# Patient Record
Sex: FEMALE | Race: BLACK OR AFRICAN AMERICAN
[De-identification: names, ages, dates, MRNs, and addresses within clinical notes are randomized per-mention and may not be internally consistent; named-entity substitution may affect disease eponyms.]

---

## 2019-08-05 ENCOUNTER — HOSPITAL ENCOUNTER (OUTPATIENT)
Dept: HOSPITAL 97 - ER | Age: 54
Setting detail: OBSERVATION
LOS: 3 days | Discharge: HOME | End: 2019-08-08
Attending: FAMILY MEDICINE | Admitting: FAMILY MEDICINE
Payer: COMMERCIAL

## 2019-08-05 VITALS — BODY MASS INDEX: 30.3 KG/M2

## 2019-08-05 DIAGNOSIS — E87.6: ICD-10-CM

## 2019-08-05 DIAGNOSIS — E87.5: ICD-10-CM

## 2019-08-05 DIAGNOSIS — E66.9: ICD-10-CM

## 2019-08-05 DIAGNOSIS — K80.00: Primary | ICD-10-CM

## 2019-08-05 LAB
ALBUMIN SERPL BCP-MCNC: 4 G/DL (ref 3.4–5)
ALP SERPL-CCNC: 121 U/L (ref 45–117)
ALT SERPL W P-5'-P-CCNC: 21 U/L (ref 12–78)
AST SERPL W P-5'-P-CCNC: 18 U/L (ref 15–37)
BUN BLD-MCNC: 9 MG/DL (ref 7–18)
GLUCOSE SERPLBLD-MCNC: 98 MG/DL (ref 74–106)
HCT VFR BLD CALC: 40.7 % (ref 36–45)
LIPASE SERPL-CCNC: 133 U/L (ref 73–393)
LYMPHOCYTES # SPEC AUTO: 1.4 K/UL (ref 0.7–4.9)
PMV BLD: 8.8 FL (ref 7.6–11.3)
POTASSIUM SERPL-SCNC: 3.5 MMOL/L (ref 3.5–5.1)
RBC # BLD: 4.39 M/UL (ref 3.86–4.86)
TROPONIN (EMERG DEPT USE ONLY): < 0.02 NG/ML (ref 0–0.04)

## 2019-08-05 PROCEDURE — 84132 ASSAY OF SERUM POTASSIUM: CPT

## 2019-08-05 PROCEDURE — 76705 ECHO EXAM OF ABDOMEN: CPT

## 2019-08-05 PROCEDURE — 84484 ASSAY OF TROPONIN QUANT: CPT

## 2019-08-05 PROCEDURE — 36415 COLL VENOUS BLD VENIPUNCTURE: CPT

## 2019-08-05 PROCEDURE — 94760 N-INVAS EAR/PLS OXIMETRY 1: CPT

## 2019-08-05 PROCEDURE — 83735 ASSAY OF MAGNESIUM: CPT

## 2019-08-05 PROCEDURE — 80076 HEPATIC FUNCTION PANEL: CPT

## 2019-08-05 PROCEDURE — 80053 COMPREHEN METABOLIC PANEL: CPT

## 2019-08-05 PROCEDURE — 96374 THER/PROPH/DIAG INJ IV PUSH: CPT

## 2019-08-05 PROCEDURE — 85025 COMPLETE CBC W/AUTO DIFF WBC: CPT

## 2019-08-05 PROCEDURE — 74181 MRI ABDOMEN W/O CONTRAST: CPT

## 2019-08-05 PROCEDURE — 93005 ELECTROCARDIOGRAM TRACING: CPT

## 2019-08-05 PROCEDURE — 74177 CT ABD & PELVIS W/CONTRAST: CPT

## 2019-08-05 PROCEDURE — 80048 BASIC METABOLIC PNL TOTAL CA: CPT

## 2019-08-05 PROCEDURE — 47562 LAPAROSCOPIC CHOLECYSTECTOMY: CPT

## 2019-08-05 PROCEDURE — 83690 ASSAY OF LIPASE: CPT

## 2019-08-05 PROCEDURE — 99285 EMERGENCY DEPT VISIT HI MDM: CPT

## 2019-08-05 PROCEDURE — 88304 TISSUE EXAM BY PATHOLOGIST: CPT

## 2019-08-06 RX ADMIN — DEXTROSE AND SODIUM CHLORIDE SCH MLS: 5; .45 INJECTION, SOLUTION INTRAVENOUS at 11:05

## 2019-08-06 RX ADMIN — METRONIDAZOLE SCH MLS: 500 INJECTION, SOLUTION INTRAVENOUS at 17:56

## 2019-08-06 RX ADMIN — MORPHINE SULFATE PRN MG: 2 INJECTION, SOLUTION INTRAMUSCULAR; INTRAVENOUS at 21:19

## 2019-08-06 RX ADMIN — MORPHINE SULFATE PRN MG: 2 INJECTION, SOLUTION INTRAMUSCULAR; INTRAVENOUS at 11:10

## 2019-08-06 RX ADMIN — DEXTROSE AND SODIUM CHLORIDE SCH MLS: 5; .45 INJECTION, SOLUTION INTRAVENOUS at 20:24

## 2019-08-06 RX ADMIN — CEFTRIAXONE SCH MLS: 1 INJECTION, SOLUTION INTRAVENOUS at 15:14

## 2019-08-06 RX ADMIN — Medication SCH ML: at 20:21

## 2019-08-06 RX ADMIN — MORPHINE SULFATE PRN MG: 2 INJECTION, SOLUTION INTRAMUSCULAR; INTRAVENOUS at 15:15

## 2019-08-06 NOTE — EKG
Test Date:    2019-08-05               Test Time:    23:48:06

Technician:   VALERIE                                     

                                                     

MEASUREMENT RESULTS:                                       

Intervals:                                           

Rate:         51                                     

ME:           136                                    

QRSD:         82                                     

QT:           414                                    

QTc:          381                                    

Axis:                                                

P:            55                                     

ME:           136                                    

QRS:          14                                     

T:            91                                     

                                                     

INTERPRETIVE STATEMENTS:                                       

                                                     

Sinus bradycardia

Nonspecific T wave abnormality

Abnormal ECG

No previous ECG available for comparison



Electronically Signed On 08-06-19 07:33:40 CDT by Marvin Petty

## 2019-08-06 NOTE — ER
Nurse's Notes                                                                                     

 [facility]                                                                 

Name: Michelle Stevens                                                                                

Age: 53 yrs                                                                                       

Sex: Female                                                                                       

: 1965                                                                                   

MRN: [id number]                                                                                   

Arrival Date: 2019                                                                          

Time: 20:32                                                                                       

Account#: [id number]                                                                            

Bed 13                                                                                            

Private MD:                                                                                       

Diagnosis: Abdominal and pelvic pain;Cholelithiasis;Cholecystitis;Choledochalithiasis             

                                                                                                  

Presentation:                                                                                     

                                                                                             

21:05 Presenting complaint: Patient states: epigastric pain started Saturday. pt stated she   ak1 

      took advil last night, pain resolved. pt c/o increased pain this morning with N/V           

      today. Transition of care: patient was not received from another setting of care. Onset     

      of symptoms is unknown. Risk Assessment: Do you want to hurt yourself or someone else?      

      Patient reports no desire to harm self or others. Initial Sepsis Screen: Does the           

      patient meet any 2 criteria? No. Patient's initial sepsis screen is negative. Does the      

      patient have a suspected source of infection? No. Patient's initial sepsis screen is        

      negative. Care prior to arrival: None.                                                      

21:05 Method Of Arrival: Ambulatory                                                           ak1 

21:05 Acuity: SANTIAGO 3                                                                           ak1 

                                                                                                  

Triage Assessment:                                                                                

21:06 General: Appears in no apparent distress. uncomfortable, Behavior is calm, cooperative. ak1 

                                                                                                  

OB/GYN:                                                                                           

21:06 LMP N/A - Post-menopause                                                                ak1 

                                                                                                  

Historical:                                                                                       

- Allergies:                                                                                      

21:06 No Known Allergies;                                                                     ak1 

- Home Meds:                                                                                      

21:06 None [Active];                                                                          ak1 

- PMHx:                                                                                           

21:06 None;                                                                                   ak1 

- PSHx:                                                                                           

21:06 None;                                                                                   ak1 

                                                                                                  

- Immunization history:: Adult Immunizations unknown.                                             

- Social history:: Smoking status: Patient/guardian denies using tobacco.                         

- Ebola Screening: : No symptoms or risks identified at this time.                                

                                                                                                  

                                                                                                  

Screenin:45 Abuse screen: Denies threats or abuse. Denies injuries from another. Abuse screen:.     aa1 

      Nutritional screening: No deficits noted. Tuberculosis screening: No symptoms or risk       

      factors identified. Fall Risk None identified.                                              

                                                                                                  

Assessment:                                                                                       

21:45 General: Appears in no apparent distress. comfortable, Behavior is calm, cooperative,   aa1 

      appropriate for age. Pain: Complains of pain in epigastric area Pain currently is 10        

      out of 10 on a pain scale. Pain began 2-3 days ago. Neuro: Level of Consciousness is        

      awake, alert, obeys commands, Oriented to person, place, time, situation, Moves all         

      extremities. Full function. Cardiovascular: Denies chest pain, palpitations, shortness      

      of breath, Heart tones S1 S2 present Rhythm is regular. Respiratory: Airway is patent       

      Respiratory effort is even, unlabored, Respiratory pattern is regular, symmetrical. GI:     

      Abdomen is non-distended, Bowel sounds present X 4 quads. Abd is soft and non tender X      

      4 quads. Reports epigastric pain, nausea, vomiting. : No signs and/or symptoms were       

      reported regarding the genitourinary system. EENT: No signs and/or symptoms were            

      reported regarding the EENT system. Derm: Skin is intact, is healthy with good turgor,      

      Skin is pink, warm \T\ dry. Musculoskeletal: Circulation, motion, and sensation intact.     

      Capillary refill < 3 seconds.                                                               

22:30 Reassessment: Patient appears in no apparent distress at this time. Patient and/or      aa1 

      family updated on plan of care and expected duration. Pain level reassessed. Patient is     

      alert, oriented x 3, equal unlabored respirations, skin warm/dry/pink. Awaiting charge      

      nurse for u/s IV.                                                                           

23:50 Reassessment: Patient appears in no apparent distress at this time. Patient and/or      aa1 

      family updated on plan of care and expected duration. Pain level reassessed. Patient is     

      alert, oriented x 3, equal unlabored respirations, skin warm/dry/pink. Awaiting             

      provider reassessment.                                                                      

08                                                                                             

00:30 Reassessment: Patient appears in no apparent distress at this time. Patient and/or      aa1 

      family updated on plan of care and expected duration. Pain level reassessed. Patient is     

      alert, oriented x 3, equal unlabored respirations, skin warm/dry/pink. Awaiting CT scan.    

01:34 Reassessment: Patient appears in no apparent distress at this time. Patient and/or      aa1 

      family updated on plan of care and expected duration. Pain level reassessed. Patient is     

      alert, oriented x 3, equal unlabored respirations, skin warm/dry/pink. Pt taken to CT       

      at this time.                                                                               

02:05 Reassessment: Patient appears in no apparent distress at this time. Patient and/or      aa1 

      family updated on plan of care and expected duration. Pain level reassessed. Patient is     

      alert, oriented x 3, equal unlabored respirations, skin warm/dry/pink. Pt back from CT      

      and CT tech reports IV infiltrated with IV contrast. IV dc'd and warm compress applied.     

03:30 Reassessment: Patient appears in no apparent distress at this time. Patient and/or      aa1 

      family updated on plan of care and expected duration. Pain level reassessed. Patient is     

      alert, oriented x 3, equal unlabored respirations, skin warm/dry/pink. Awaiting CT          

      results.                                                                                    

04:03 Reassessment: Patient appears in no apparent distress at this time. Patient and/or      jb4 

      family updated on plan of care and expected duration. Pain level reassessed. Patient is     

      alert, oriented x 3, equal unlabored respirations, skin warm/dry/pink. Pt given warming     

      pack for area of infiltration.                                                              

04:51 Reassessment: Patient appears in no apparent distress at this time. Patient and/or      jb4 

      family updated on plan of care and expected duration. Pain level reassessed. Patient is     

      alert, oriented x 3, equal unlabored respirations, skin warm/dry/pink. Swelling at the      

      site of infiltration appears to have decreased. Pt reports feeling better and like the      

      swelling has gone down.                                                                     

06:00 Reassessment: Patient appears in no apparent distress at this time. Patient and/or      jb4 

      family updated on plan of care and expected duration. Pain level reassessed. Patient is     

      alert, oriented x 3, equal unlabored respirations, skin warm/dry/pink.                      

06:52 Reassessment: Patient appears in no apparent distress at this time. Patient and/or      jb4 

      family updated on plan of care and expected duration. Pain level reassessed. Patient is     

      alert, oriented x 3, equal unlabored respirations, skin warm/dry/pink.                      

07:00 General: Appears in no apparent distress. comfortable, Behavior is calm, cooperative.   rb1 

      Pain: Pain currently is 2 out of 10 on a pain scale. Neuro: Level of Consciousness is       

      awake, alert, obeys commands, Oriented to person, place, time, situation.                   

      Cardiovascular: Capillary refill < 3 seconds is brisk in bilateral fingers.                 

      Respiratory: Airway is patent Respiratory effort is even, unlabored, Respiratory            

      pattern is regular, symmetrical. Derm: Skin is dry, Skin is normal, Skin temperature is     

      warm. Musculoskeletal: Capillary refill < 3 seconds, is brisk, in bilateral fingers.        

07:09 Reassessment: Pt. went for testing.                                                     rb1 

08:00 Reassessment: Patient appears in no apparent distress at this time. No changes from     rb1 

      previously documented assessment.                                                           

08:30 Reassessment: Dr. Barcenas is at the pt. bedside.                                          rb1 

09:00 Reassessment: Patient appears in no apparent distress at this time. Patient and/or      rb1 

      family updated on plan of care and expected duration. Pain level reassessed. Patient is     

      alert, oriented x 3, equal unlabored respirations, skin warm/dry/pink.                      

10:00 Reassessment: Patient appears in no apparent distress at this time. No changes from     rb1 

      previously documented assessment. Pt. is watching TV.                                       

10:11 Reassessment: Dr. Palm is at the pt. bedside.                                          rb1 

11:00 Reassessment: Patient appears in no apparent distress at this time. Patient and/or      rb1 

      family updated on plan of care and expected duration. Pain level reassessed. Patient is     

      alert, oriented x 3, equal unlabored respirations, skin warm/dry/pink.                      

12:00 Reassessment: Patient appears in no apparent distress at this time. No changes from     rb1 

      previously documented assessment.                                                           

12:05 Reassessment: Gave report to CASS Zarate. Information from the SBAR was given. All        rb1 

      questions asked and answered.                                                               

                                                                                                  

Vital Signs:                                                                                      

                                                                                             

21:06  / 73; Pulse 56; Resp 18; Temp 97.1; Pulse Ox 100% on R/A; Weight 77.11 kg (R);   ak1 

      Height 5 ft. 2 in. (157.48 cm) (R); Pain 10/10;                                             

23:50  / 75; Pulse 58; Resp 16; Temp 97.3; Pulse Ox 99% on R/A; Pain 2/10;              aa1 

                                                                                             

01:00  / 75; Pulse 57; Resp 16; Pulse Ox 100% on R/A;                                   aa1 

02:30  / 77; Pulse 50; Resp 16; Pulse Ox 100% on R/A;                                   aa1 

03:30  / 60; Pulse 51; Resp 16; Pulse Ox 100% on R/A;                                   aa1 

04:30  / 59; Pulse 50; Resp 16; Temp 98.3(O); Pulse Ox 100% on R/A;                     bb  

05:44  / 59; Pulse 67; Resp 16; Pulse Ox 99% on R/A;                                    mt  

06:00  / 57; Pulse 75; Resp 16; Pulse Ox 100% on R/A;                                   jb4 

07:00  / 71; Pulse 63; Resp 17; Temp 98.4(O); Pulse Ox 100% on R/A; Pain 2/10;          rb1 

08:00  / 70; Pulse 63; Resp 16; Temp 98.4(O); Pulse Ox 100% on R/A; Pain 2/10;          rb1 

09:00  / 76; Pulse 49; Resp 15; Temp 98.2(O); Pulse Ox 100% on R/A; Pain 2/10;          rb1 

10:00  / 61; Pulse 62; Resp 16; Temp 98.3(O); Pulse Ox 100% on R/A; Pain 2/10;          rb1 

11:00  / 73; Pulse 76; Resp 17; Temp 98.1(O); Pulse Ox 98% ; Pain 5/10;                 rb1 

11:55  / 70; Pulse 55; Resp 16; Temp 97.9(O); Pulse Ox 100% ; Pain 0/10;                rb1 

08/05                                                                                             

21:06 Body Mass Index 31.09 (77.11 kg, 157.48 cm)                                             ak1 

                                                                                                  

ED Course:                                                                                        

                                                                                             

20:32 Patient arrived in ED.                                                                  cl3 

21:06 Triage completed.                                                                       ak1 

21:06 Arm band placed on Patient placed in waiting room, Patient notified of wait time.       ak1 

21:45 Patient has correct armband on for positive identification. Bed in low position. Call   aa1 

      light in reach. Pulse ox on. NIBP on. Warm blanket given.                                   

21:58 Naomie Guerrier FNP-C is PHCP.                                                        kb  

21:58 Joo Martines MD is Attending Physician.                                             kb  

22:22 Olga Mohamud RN is Primary Nurse.                                                aa1 

23:15 Initial lab(s) drawn, by me, sent to lab. Missed attempt(s): 18 gauge in right          bb  

      antecubital area. Bleeding controlled, band aid applied, catheter tip intact. Inserted      

      saline lock: 22 gauge in left antecubital area, using aseptic technique.                    

23:30 EKG done, by ED staff, reviewed by Naomie DIAZ.                               aa1 

08/06                                                                                             

02:05 IV discontinued, intact, bleeding controlled, Pressure dressing applied.                aa1 

02:24 CT Abd/Pelvis - IV Contrast Only In Process Unspecified.                                EDMS

03:46 Report given to Nicho Reynoso RN.                                                       aa1 

04:03 Ilia Reynoso, RN is Primary Nurse.                                                     jb4 

06:15 Inserted saline lock: 24 gauge in right hand, using aseptic technique.                  ea  

07:16 US Abdomen Limited In Process Unspecified.                                              EDMS

07:50 Patient moved to MRI via wheelchair.                                                    em2 

08:11 Cholangiogram In Process Unspecified.                                                   EDMS

08:19 MRI completed. Patient tolerated well. Patient moved back from [facility].                     em2 

08:49 Peyton Palm MD is Hospitalizing Provider.                                            kdr 

12:20 No provider procedures requiring assistance completed. Patient admitted, IV remains in  rb1 

      place.                                                                                      

                                                                                                  

Administered Medications:                                                                         

06:20 Drug: Zosyn 4.5 grams Route: IV; Rate: calculated rate; Site: right hand;               jb4 

                                                                                                  

                                                                                                  

Outcome:                                                                                          

08:50 Decision to Hospitalize by Provider.                                                    kdr 

12:20 Admitted to Tele accompanied by tech, family with patient, via wheelchair, room 429,    rb1 

      with chart, Report called to  CASS Zarate                                                     

12:20 Condition: stable                                                                           

12:20 Instructed on the need for admit.                                                           

12:21 Patient left the ED.                                                                    rb1 

                                                                                                  

Signatures:                                                                                       

Dispatcher [employer]                           EDMS                                                 

Naomie Guerrier, WENDYP-C                 FNP-CkOlga Wick, RN                  RN   aa1                                                  

Jem Campuzano MD MD kdr Ballard, Brenda RN                     RN   Ryder Napier                              em2                                                  

Lisset Nayak RN                       RN   ak1                                                  

Natalie Chapman, RN                     RN   Ilia Esquivel, RN                       Viridiana Goss mt, Elena, RN RN ea Lewis, Charde                                cl3                                                  

                                                                                                  

Corrections: (The following items were deleted from the chart)                                    

05:41 04:30  / 59; Pulse 50bpm; Resp 16bpm; Pulse Ox 100% RA; amanda duvall  

                                                                                                  

**************************************************************************************************

## 2019-08-06 NOTE — CON
Date of Consultation:  08/06/2019



Reason For Consultation:  Abdominal pain.



History Of Present Illness:  The patient is a 53-year-old black female who comes in with a 2-day hist
ory of epigastric right upper quadrant pain going to the back, associated with nausea and vomiting, o
ccasional bloating, belching, and heartburn.  She has had similar symptoms in the past.  Fried food m
akes it worse.  No sore throat, runny nose, cough, headaches, or dizziness.  No chest pain.  No fever
 or chills.  No diarrhea or constipation.  No blood in her stool.  She has had hematuria in the past,
 which has been worked up and resolved.



Review of Systems:

Otherwise unremarkable.



Past Medical History:  Negative.



Past Surgical History:  Negative.



Allergies:  NONE.



Social History:  Patient does not smoke, drinks occasionally.



Family History:  Significant for heart disease.



Physical Examination:

Vital Signs:  Stable.  She is currently afebrile. 

General:  She is awake, alert, and oriented x3. 

Head and Neck:  No evidence of icterus.  Cranial nerves 2 through 12 grossly within normal limits.  N
o neck masses.  No JVD.  Throat clear.  Neck is supple. 

Chest:  Clear. 

Heart:  S1, S2. 

Abdomen:  Soft.  Mild right upper quadrant tenderness.  No rebound, rigidity, or guarding. 

Extremities:  Adequately perfused and nontender. 

Neuro:  Nonfocal.



Laboratory Data:  White count is 10,000 with a left shift.  Chemistry shows slight elevation of the a
lkaline phosphatase to 121.



Imaging Data:  She had an abdominal ultrasound, CAT scan of the abdomen and pelvis, and an MRCP.  The
 ultrasound shows cholelithiasis with cholecystitis.  Small echogenic structure within the duct may r
epresent a stone or debris.  The duct is normal in caliber.  Patient had an MRCP, which shows choleli
thiasis with cholecystitis.  The gallbladder wall was thickened.  The biliary tree is upper limits of
 normal.



Assessment:  Acute cholecystitis, cholelithiasis, and possible choledocholithiasis.



Recommendations:  Admit, n.p.o., IV fluid, IV antibiotics, GI consultation for possible ERCP.  Discus
sed the case with Dr. Tilley.  He will evaluate and once he is done evaluating, following which, we w
ill proceed with laparoscopic cholecystectomy, possible open.  Patient understands the risks, benefit
s, and alternatives and agrees to procedure.





AS/MODL

DD:  08/06/2019 10:12:34Voice ID:  [id number]

DT:  08/06/2019 14:22:13Report ID:  [id number]

## 2019-08-06 NOTE — EDPHYS
Physician Documentation                                                                           

 [facility]                                                                 

Name: Michelle Stevens                                                                                

Age: 53 yrs                                                                                       

Sex: Female                                                                                       

: 1965                                                                                   

MRN: [id number]                                                                                   

Arrival Date: 2019                                                                          

Time: 20:32                                                                                       

Account#: [id number]                                                                            

Bed 13                                                                                            

Private MD:                                                                                       

ED Physician Joo Martines                                                                      

HPI:                                                                                              

                                                                                             

00:29 This 53 yrs old Black Female presents to ER via Ambulatory with complaints of Abdominal kb  

      Pain.                                                                                       

00:29 The patient presents with abdominal pain in the right upper quadrant. Onset: The        kb  

      symptoms/episode began/occurred 2 day(s) ago. The symptoms do not radiate. Associated       

      signs and symptoms: Pertinent positives: nausea and vomiting. The symptoms are              

      described as intermittent. Modifying factors: The symptoms are alleviated by nothing,       

      the symptoms are aggravated by nothing. Severity of pain: At its worst the pain was         

      moderate in the emergency department the pain is unchanged. The patient has not             

      experienced similar symptoms in the past. The patient has not recently seen a physician.    

                                                                                                  

OB/GYN:                                                                                           

                                                                                             

21:06 LMP N/A - Post-menopause                                                                ak1 

                                                                                                  

Historical:                                                                                       

- Allergies:                                                                                      

21:06 No Known Allergies;                                                                     ak1 

- Home Meds:                                                                                      

21:06 None [Active];                                                                          ak1 

- PMHx:                                                                                           

21:06 None;                                                                                   ak1 

- PSHx:                                                                                           

21:06 None;                                                                                   ak1 

                                                                                                  

- Immunization history:: Adult Immunizations unknown.                                             

- Social history:: Smoking status: Patient/guardian denies using tobacco.                         

- Ebola Screening: : No symptoms or risks identified at this time.                                

                                                                                                  

                                                                                                  

ROS:                                                                                              

                                                                                             

00:29 Constitutional: Negative for fever, chills, and weight loss, ENT: Negative for injury,  kb  

      pain, and discharge, Neck: Negative for injury, pain, and swelling, Cardiovascular:         

      Negative for chest pain, palpitations, and edema, Respiratory: Negative for shortness       

      of breath, cough, wheezing, and pleuritic chest pain, Back: Negative for injury and         

      pain, : Negative for injury, bleeding, discharge, and swelling, MS/Extremity:             

      Negative for injury and deformity, Skin: Negative for injury, rash, and discoloration,      

      Neuro: Negative for headache, weakness, numbness, tingling, and seizure.                    

      Abdomen/GI: Positive for abdominal pain, nausea and vomiting.                               

                                                                                                  

Exam:                                                                                             

00:29 Constitutional:  This is a well developed, well nourished patient who is awake, alert,  kb  

      and in no acute distress. Head/Face:  Normocephalic, atraumatic. ENT:  Nares patent. No     

      nasal discharge, no septal abnormalities noted.  Tympanic membranes are normal and          

      external auditory canals are clear.  Oropharynx with no redness, swelling, or masses,       

      exudates, or evidence of obstruction, uvula midline.  Mucous membranes moist. Neck:         

      Trachea midline, no thyromegaly or masses palpated, and no cervical lymphadenopathy.        

      Supple, full range of motion without nuchal rigidity, or vertebral point tenderness.        

      No Meningismus. Chest/axilla:  Normal chest wall appearance and motion.  Nontender with     

      no deformity.  No lesions are appreciated. Cardiovascular:  Regular rate and rhythm         

      with a normal S1 and S2.  No gallops, murmurs, or rubs.  Normal PMI, no JVD.  No pulse      

      deficits. Respiratory:  Lungs have equal breath sounds bilaterally, clear to                

      auscultation and percussion.  No rales, rhonchi or wheezes noted.  No increased work of     

      breathing, no retractions or nasal flaring. Back:  No spinal tenderness.  No                

      costovertebral tenderness.  Full range of motion. Skin:  Warm, dry with normal turgor.      

      Normal color with no rashes, no lesions, and no evidence of cellulitis. MS/ Extremity:      

      Pulses equal, no cyanosis.  Neurovascular intact.  Full, normal range of motion. Neuro:     

       Awake and alert, GCS 15, oriented to person, place, time, and situation.  Cranial          

      nerves II-XII grossly intact.  Motor strength 5/5 in all extremities.  Sensory grossly      

      intact.  Cerebellar exam normal.  Normal gait.                                              

00:29 Abdomen/GI: Inspection: abdomen appears normal, Bowel sounds: normal, in all quadrants,     

      Palpation: soft, in all quadrants, mild abdominal tenderness, in the right upper            

      quadrant.                                                                                   

                                                                                                  

Vital Signs:                                                                                      

                                                                                             

21:06  / 73; Pulse 56; Resp 18; Temp 97.1; Pulse Ox 100% on R/A; Weight 77.11 kg (R);   ak1 

      Height 5 ft. 2 in. (157.48 cm) (R); Pain 10/10;                                             

23:50  / 75; Pulse 58; Resp 16; Temp 97.3; Pulse Ox 99% on R/A; Pain 2/10;              aa1 

                                                                                             

01:00  / 75; Pulse 57; Resp 16; Pulse Ox 100% on R/A;                                   aa1 

02:30  / 77; Pulse 50; Resp 16; Pulse Ox 100% on R/A;                                   aa1 

03:30  / 60; Pulse 51; Resp 16; Pulse Ox 100% on R/A;                                   aa1 

04:30  / 59; Pulse 50; Resp 16; Temp 98.3(O); Pulse Ox 100% on R/A;                     bb  

05:44  / 59; Pulse 67; Resp 16; Pulse Ox 99% on R/A;                                    mt  

06:00  / 57; Pulse 75; Resp 16; Pulse Ox 100% on R/A;                                   jb4 

07:00  / 71; Pulse 63; Resp 17; Temp 98.4(O); Pulse Ox 100% on R/A; Pain 2/10;          rb1 

08:00  / 70; Pulse 63; Resp 16; Temp 98.4(O); Pulse Ox 100% on R/A; Pain 2/10;          rb1 

09:00  / 76; Pulse 49; Resp 15; Temp 98.2(O); Pulse Ox 100% on R/A; Pain 2/10;          rb1 

10:00  / 61; Pulse 62; Resp 16; Temp 98.3(O); Pulse Ox 100% on R/A; Pain 2/10;          rb1 

11:00  / 73; Pulse 76; Resp 17; Temp 98.1(O); Pulse Ox 98% ; Pain 5/10;                 rb1 

11:55  / 70; Pulse 55; Resp 16; Temp 97.9(O); Pulse Ox 100% ; Pain 0/10;                rb1 

                                                                                             

21:06 Body Mass Index 31.09 (77.11 kg, 157.48 cm)                                             ak1 

                                                                                                  

MDM:                                                                                              

                                                                                             

22:01 Patient medically screened.                                                             kb  

                                                                                             

00:32 Data reviewed: vital signs, nurses notes. Data interpreted: Pulse oximetry: on room air kb  

      is 99 %. Interpretation: normal.                                                            

                                                                                                  

                                                                                             

22:02 Order name: Hepatic Function; Complete Time: 23:35                                      kb  

                                                                                             

22:02 Order name: Basic Metabolic Panel; Complete Time: 23:35                                 kb  

                                                                                             

22:02 Order name: CBC with Diff; Complete Time: 23:35                                         kb  

                                                                                             

22:02 Order name: Lipase; Complete Time: 23:35                                                kb  

                                                                                             

22:02 Order name: Troponin (emerg Dept Use Only); Complete Time: 23:35                        kb  

                                                                                             

22:15 Order name: US Abdomen Limited; Complete Time: 08:51                                    kb  

                                                                                             

22:02 Order name: IV Saline Lock; Complete Time: 23:35                                        kb  

                                                                                             

22:02 Order name: Labs collected and sent; Complete Time: 23:35                               kb  

                                                                                             

22:02 Order name: EKG; Complete Time: 22:03                                                   kb  

                                                                                             

22:02 Order name: EKG - Nurse/Tech; Complete Time: 23:47                                      kb  

                                                                                             

00:32 Order name: CT Abd/Pelvis - IV Contrast Only; Complete Time: 14:51                      kb  

                                                                                             

05:46 Order name: Cholangiogram; Complete Time: 08:51                                         EDMS

                                                                                                  

Administered Medications:                                                                         

06:20 Drug: Zosyn 4.5 grams Route: IV; Rate: calculated rate; Site: right hand;               jb4 

                                                                                                  

                                                                                                  

Disposition:                                                                                      

05:36 Spoke with Dr. Barcenas who recommends MRCP and day shift to call with results. Explained  ps1 

      patient had evidence of acute cholecystitis on CT scan. .                                   

                                                                                                  

Disposition:                                                                                      

19 08:50 Hospitalization ordered by Peyton Palm for Inpatient Admission. Preliminary      

  diagnosis are Abdominal and pelvic pain, Cholelithiasis, Cholecystitis,                         

  Choledochalithiasis.                                                                            

- Bed requested for Telemetry/MedSurg (Inpatient).                                                

- Status is Inpatient Admission.                                                              rb1 

- Condition is Fair.                                                                              

- Problem is new.                                                                                 

- Symptoms have improved.                                                                         

UTI on Admission? No                                                                              

                                                                                                  

                                                                                                  

                                                                                                  

Signatures:                                                                                       

Dispatcher [employer]                           EDMS                                                 

Naomie Guerrier FNP-C                 MAREK-Jem Singleton MD MD   kdr                                                  

Lisset Nayak RN                       RN   ak1                                                  

Natalie Chapman RN                     RN   rb1                                                  

Ilia Reynoso, RN                       RN   jb4                                                  

Alexi Adam, RN                       RN   ja1                                                  

Joo Martines MD MD   ps1                                                  

                                                                                                  

Corrections: (The following items were deleted from the chart)                                    

08:52 08:50 Hospitalization Ordered by Peyton Palm MD for Inpatient Admission. Preliminary   kdr 

      diagnosis is Abdominal and pelvic pain; Cholelithiasis; Choledochallithiasis. Bed           

      requested for Telemetry/MedSurg (Inpatient). Status is Inpatient Admission. Condition       

      is Fair. Problem is new. Symptoms have improved. UTI on Admission? No. kdr                  

11:44 08:52 2019 08:50 Hospitalization Ordered by Peyton Palm MD for Inpatient         ja1 

      Admission. Preliminary diagnosis is Abdominal and pelvic pain; Cholelithiasis;              

      Cholecystitis; Choledochalithiasis. Bed requested for Telemetry/MedSurg (Inpatient).        

      Status is Inpatient Admission. Condition is Fair. Problem is new. Symptoms have             

      improved. UTI on Admission? No. kdr                                                         

12:21 11:44 2019 08:50 Hospitalization Ordered by Peyton Palm MD for Inpatient         rb1 

      Admission. Preliminary diagnosis is Abdominal and pelvic pain; Cholelithiasis;              

      Cholecystitis; Choledochalithiasis. Bed requested for Telemetry/MedSurg (Inpatient).        

      Status is Inpatient Admission. Condition is Fair. Problem is new. Symptoms have             

      improved. UTI on Admission? No. ja1                                                         

                                                                                                  

**************************************************************************************************

## 2019-08-06 NOTE — HP
Date of Admission:  08/06/2019



Chief Complaint:  Right upper quadrant abdominal pain.



History Of Present Illness:  Patient is a 53-year-old female with no significant past medical history
, comes in with abdominal pain that has been ongoing for the past 3 days.  Patient states the pain is
 in the right upper quadrant, for the most part nonradiating, associated with nausea and vomiting.  BAILEY lee's pain started when she ate fries with some fried topping.  Patient denies any similar pain be
fore.  Pain is improved when she stopped eating.  Denies any fevers or chills.  Patient's symptoms ar
e constant, moderate, progressively worsening.  She came into the ER for further evaluation.  Workup 
revealed normal WBC count.  Liver enzymes were within normal limits.  However, alkaline phosphatase w
as elevated at 121.  Imaging studies showed cholelithiasis with cholecystitis.  MRCP was also done, w
hich did not show any ductal stones.  Dr. Barcenas with General Surgery was consulted by the ER.  Crow fink was started on Zosyn and given IV fluids, referred for admission.  When seen in the ER, she was unruly
ke, alert, oriented x3, in some mild distress.  States the pain is improved with medications.



Past Medical History:  None.



Past Surgical History:  None.



Allergies:  NO KNOWN DRUG ALLERGIES.



Medications:  Patient does not take any medications at home.



Obstetrics/gynecologic History:  Patient is postmenopausal.



Social History:  Patient denies any tobacco use.  Does drink alcohol daily and drinks a glass of wine
 every day.  Last drink was on Saturday.  No illicit drug use.



Family History:  Diabetes is currently running in the family.



Review of Systems:

An 11-point system reviewed, negative except as per HPI.



Physical Examination:

Vital Signs:  Blood pressure 153/73, respirations 18, temperature 97.1, heart rate 56, O2 100% on patricia
m air. 

General:  Awake, alert, oriented x3.  Some mild distress, ill-appearing female. 

CV:  S1, S2.  Regular rate and rhythm.  Peripheral pulses present. 

Respiratory:  Moving air well bilaterally.  No wheezing or stridor.  No use of accessory muscles. 

Gastrointestinal:  Abdomen is soft.  Tenderness to palpation in the right upper quadrant.  No rebound
 or guarding.  Positive bowel sounds. 

Extremities:  No clubbing, cyanosis, or edema.  No calf tenderness. 

Neurologic:  Cranial nerves 2 through 12 intact grossly.  No focal neurological deficit.  Speech is n
ormal. 

Skin:  No rashes.  Normal skin turgor. 

Psychiatric:  Mood is okay.  Affect is full.  Insight and judgment are good.



Laboratory Data:  Sodium 139, potassium 3.5, chloride 106, CO2 of 25, BUN 9, creatinine 0.95, glucose
 98, calcium 8.5, AST 18, ALT 21, alk phosphatase 121.  Troponin less than 0.02.  WBC 10, H and H 13.
6 and 40.7, platelets 297.



Imaging Studies:  CT scan of the abdomen and pelvis shows cholelithiasis with evidence of acute lorenza
cystitis.  No biliary tree dilatation.  MRCP shows cholelithiasis with cholecystitis and biliary tree
 upper limits normal caliber without filling defect.  Abdominal ultrasound shows cholelithiasis with 
cholecystitis, small echogenic structure within the duct may represent a stone or debris; duct is nor
mal caliber.



Assessment And Plan:  A 53-year-old female with:

1.Acute cholecystitis with cholelithiasis without biliary obstruction.  Ultrasound initially showed 
possible stone or debris.  However, MRCP is negative for ductal stone.  Does show upper limits of nor
mal caliber of the biliary tree.  For now, we will keep n.p.o.  Start on IV antibiotics and IV fluids
.  Dr. Barcenas with General Surgery has been consulted.  We will consult Dr. Tilley with GI for possibl
e intervention.  Liver enzymes are within normal limits.  We will add morphine for pain.

2.Elevated alkaline phosphatase level.

3.Obesity.

4.Non-intractable nausea, vomiting.  We will continue with antiemetics.



Plan:  Admit the patient to Med-Surg, [facility] as observation.





SHELBI

DD:  08/06/2019 11:02:53Voice ID:  [id number]

## 2019-08-07 VITALS — OXYGEN SATURATION: 98 %

## 2019-08-07 LAB
ALBUMIN SERPL BCP-MCNC: 3.4 G/DL (ref 3.4–5)
ALP SERPL-CCNC: 95 U/L (ref 45–117)
ALT SERPL W P-5'-P-CCNC: 16 U/L (ref 12–78)
AST SERPL W P-5'-P-CCNC: 14 U/L (ref 15–37)
BUN BLD-MCNC: 8 MG/DL (ref 7–18)
GLUCOSE SERPLBLD-MCNC: 112 MG/DL (ref 74–106)
HCT VFR BLD CALC: 36.7 % (ref 36–45)
LYMPHOCYTES # SPEC AUTO: 2.1 K/UL (ref 0.7–4.9)
MAGNESIUM SERPL-MCNC: 2.2 MG/DL (ref 1.8–2.4)
PMV BLD: 9 FL (ref 7.6–11.3)
POTASSIUM SERPL-SCNC: 2.9 MMOL/L (ref 3.5–5.1)
RBC # BLD: 4.03 M/UL (ref 3.86–4.86)

## 2019-08-07 PROCEDURE — 0FT44ZZ RESECTION OF GALLBLADDER, PERCUTANEOUS ENDOSCOPIC APPROACH: ICD-10-PCS

## 2019-08-07 RX ADMIN — POTASSIUM CHLORIDE SCH MLS: 200 INJECTION, SOLUTION INTRAVENOUS at 08:34

## 2019-08-07 RX ADMIN — DEXTROSE AND SODIUM CHLORIDE SCH: 5; .45 INJECTION, SOLUTION INTRAVENOUS at 11:05

## 2019-08-07 RX ADMIN — Medication SCH: at 08:36

## 2019-08-07 RX ADMIN — METRONIDAZOLE SCH MLS: 500 INJECTION, SOLUTION INTRAVENOUS at 08:31

## 2019-08-07 RX ADMIN — Medication SCH ML: at 21:29

## 2019-08-07 RX ADMIN — POTASSIUM CHLORIDE SCH MLS: 200 INJECTION, SOLUTION INTRAVENOUS at 06:31

## 2019-08-07 RX ADMIN — HYDROMORPHONE HYDROCHLORIDE ONE MG: 1 INJECTION, SOLUTION INTRAMUSCULAR; INTRAVENOUS; SUBCUTANEOUS at 13:10

## 2019-08-07 RX ADMIN — CEFTRIAXONE SCH MLS: 1 INJECTION, SOLUTION INTRAVENOUS at 15:17

## 2019-08-07 RX ADMIN — POTASSIUM CHLORIDE SCH: 200 INJECTION, SOLUTION INTRAVENOUS at 10:00

## 2019-08-07 RX ADMIN — DEXTROSE AND SODIUM CHLORIDE SCH MLS: 5; .45 INJECTION, SOLUTION INTRAVENOUS at 06:31

## 2019-08-07 RX ADMIN — METRONIDAZOLE SCH MLS: 500 INJECTION, SOLUTION INTRAVENOUS at 00:32

## 2019-08-07 RX ADMIN — MORPHINE SULFATE PRN MG: 2 INJECTION, SOLUTION INTRAMUSCULAR; INTRAVENOUS at 06:30

## 2019-08-07 RX ADMIN — HYDROMORPHONE HYDROCHLORIDE ONE MG: 1 INJECTION, SOLUTION INTRAMUSCULAR; INTRAVENOUS; SUBCUTANEOUS at 13:15

## 2019-08-07 RX ADMIN — METRONIDAZOLE SCH MLS: 500 INJECTION, SOLUTION INTRAVENOUS at 16:47

## 2019-08-07 NOTE — P.OP
Assistant: ENMANUEL SALGADO


Preoperative diagnosis: Acute Cholecystitis and Cholelithiasis


Postoperative diagnosis: same with Adhesion


Primary procedure: Lap Dorina, TONIA


Anesthesia: General


Estimated blood loss: Min


Specimen: GB


Findings: as above


Complications: None


Transferred to: Recovery Room


Condition: Good

## 2019-08-08 VITALS — SYSTOLIC BLOOD PRESSURE: 128 MMHG | TEMPERATURE: 97.9 F | DIASTOLIC BLOOD PRESSURE: 64 MMHG

## 2019-08-08 LAB
ALBUMIN SERPL BCP-MCNC: 3.1 G/DL (ref 3.4–5)
ALP SERPL-CCNC: 93 U/L (ref 45–117)
ALT SERPL W P-5'-P-CCNC: 66 U/L (ref 12–78)
AST SERPL W P-5'-P-CCNC: 109 U/L (ref 15–37)
BUN BLD-MCNC: 10 MG/DL (ref 7–18)
GLUCOSE SERPLBLD-MCNC: 92 MG/DL (ref 74–106)
HCT VFR BLD CALC: 35.5 % (ref 36–45)
LYMPHOCYTES # SPEC AUTO: 1.5 K/UL (ref 0.7–4.9)
PMV BLD: 9 FL (ref 7.6–11.3)
POTASSIUM SERPL-SCNC: 3.9 MMOL/L (ref 3.5–5.1)
RBC # BLD: 3.85 M/UL (ref 3.86–4.86)

## 2019-08-08 RX ADMIN — METRONIDAZOLE SCH MLS: 500 INJECTION, SOLUTION INTRAVENOUS at 08:05

## 2019-08-08 RX ADMIN — METRONIDAZOLE SCH MLS: 500 INJECTION, SOLUTION INTRAVENOUS at 00:15

## 2019-08-08 NOTE — PN
Date of Progress Note:  08/08/2019



Subjective:  Patient seen and examined.  Chart reviewed and case discussed with RN and Dr. Barcenas.  Pa
everton has done well since surgery.  Tolerating her diet.  No nausea, vomiting, ambulating well, passi
ng flatus.



Medications:  Medications list reviewed.



Physical Examination:

Vital Signs:  Temperature 97.9, heart rate 72, blood pressure 128/64, respirations 16, O2 97% on room
 air. 

General:  Awake, alert, oriented x3, not in any acute distress.  Obese female. 

Cardiovascular:  S1, S2.  No murmurs. 

Respiratory:  Moving air well bilaterally. 

Abdomen:  Soft, nontender, nondistended.  Positive bowel sounds.  Incision site clean, dry, intact. 

Extremities:  No clubbing, cyanosis, edema. 

Neurologic:  Nonfocal.



Laboratory Data:  Sodium 143, potassium 3.9, chloride 111, CO2 26, BUN 10, creatinine 0.92, glucose 9
2, calcium 8.1, .  WBC 7.9, H and H 12 and 35.5, platelets 246, neutrophils 74%.



Assessment:  53-year-old female with:

1.Acute cholelithiasis with acute cholecystitis without biliary obstruction, status post laparoscopi
c cholecystectomy and lysis of adhesions.

2.Elevated alkaline phosphatase level.

3.Elevated AST level.

4.Obesity, BMI 30.

5.Intractable nausea and vomiting, resolved.

6.Hypokalemia, replaced.



Plan:  Discharge home.  Patient will need to follow up with surgeon, Dr. Barcenas in his clinic.  Wound 
care instructions as per Surgery.





SA/MODL

DD:  08/08/2019 12:20:08Voice ID:  [id number]

DT:  08/08/2019 16:55:50Report ID:  [id number]

## 2019-08-08 NOTE — DS
Date of service 8/7/19



Consultants:  Dr. Barcenas, General Surgery; Dr. Tilley with GI.



Procedures:  On 08/07/2019, laparoscopic cholecystectomy and lysis of adhesions.



Discharge Diagnoses:  

1.   Acute cholelithiasis with cholecystitis, acute, without biliary obstruction
, status post laparoscopic cholecystectomy and lysis of adhesions.

2.   Elevated alkaline phosphatase level.

3.   Obesity, BMI 30.

4.   Non-intractable nausea and vomiting, resolved.

5.   Hyperkalemia, corrected.



Hospital Course:  The patient is a 53-year-old female who was admitted to the 
hospital with right upper quadrant abdominal pain.  The patient has no 
significant past medical history.  Workup revealed cholelithiasis with acute 
cholecystitis.  There is some questionable shadow on the ultrasound and 
possibility of ductal stone.  MRCP was done, which was negative for any ductal 
stone.  Abdominal ultrasound was repeated which did not show any ductal stones.
  The patient was seen by a surgeon, Dr. Barcenas, who evaluated the patient and 
performed laparoscopic cholecystectomy with lysis of adhesions.  Dr. Tilley 
with GI was also consulted, who did not recommend any interventions such as 
ERCP.  The patient was then doing well postoperatively, able to ambulate and 
tolerate her diet.  She was then cleared for discharge and was sent home in a 
stable condition.



Activity:  As tolerated.  No driving or operating heavy machinery while on 
narcotics.



Diet:  [place] diet.  Return to ER for worsening condition.



Followup:  Follow up with primary care physician in 1 week.  Follow up with Dr. Barcenas in 7 to 10 days for wound check.  Follow up with Dr. Tilley per his 
recommendation.



Medications:  As per medication reconciliation list.



Physical Examination:

General:  Awake, alert, oriented x3, obese female. 

CV:  S1, S2.  No murmurs. 

Respiratory:  Moving air well bilaterally. 

Abdomen:  Abdomen is soft, nontender, nondistended.  Positive bowel sounds.  
Incision site clean, dry, intact. 

Extremities:  No clubbing, cyanosis, or edema. 

Neurologic:  Nonfocal.





SA/MODL

DD:  08/07/2019 13:06:44   Voice ID:  [id number]

DT:  08/08/2019 07:47:04   Report ID:  [id number]

MTDD

## 2019-08-08 NOTE — PN
Date of Progress Note:  08/08/2019



Subjective:  Patient is awake and alert.  No complaints.



Objective:  Vital Signs:  Stable.  Afebrile.  Abdomen:  Benign.  Dressing is clean, dry, and intact.



Assessment:  Status post laparoscopic cholecystectomy.



Recommendation:  Patient cleared for discharge.  Follow up in my office in a week.  Discharge instruc
tions given.





AS/MODL

DD:  08/08/2019 10:11:25Voice ID:  [id number]

DT:  08/08/2019 14:57:27Report ID:  [id number]

## 2019-08-08 NOTE — OP
Date of Procedure:  08/07/2019



Surgeon:  Jose Barcenas MD



Assistant:  DAIMAN Christianson



Preoperative Diagnosis:  Acute cholecystitis and cholelithiasis.



Postoperative Diagnosis:  Acute cholecystitis and cholelithiasis.



Procedure:  Laparoscopic cholecystectomy.



Estimated Blood Loss:  Minimal.



Specimen:  Gallbladder.



Findings:  As above, with extensive adhesions and lysis of adhesion was performed.



Anesthesia:  General.



Complications:  None.



Disposition:  Patient tolerated the procedure in stable condition and taken to Recovery in good gener
al condition.



Procedure In Detail:  Patient was brought to the OR and placed in supine position.  General anesthesi
a was begun.  Patient was prepped and draped in the usual sterile fashion.  Marcaine 0.5% was infiltr
ated locally.  A #15 blade was used to make a 1 cm supraumbilical midline incision.  Subcutaneous tis
jayda was divided.  The fascia was identified and divided.  A #1 Vicryl stay suture was placed.  Perito
rafal cavity was entered with blunt dissection.  12 mm trocar placed into the peritoneal cavity under 
direct vision.  Pneumoperitoneum was established and a 5 mm trocar was placed, 1 in the epigastrium j
ust right of midline and 2 in the right subcostal region.  Laparoscopy revealed extensive adhesion fr
om the right lobe of the liver to the diaphragm peritoneal surface.  This was lysed with sharp and bl
unt dissection.  Bleeding was controlled with cautery.  Approximately 10 minutes of time was utilized
 for lysis of adhesions.  Gallbladder was identified, it was distended and was aspirated of infected 
bile and then fundus was retracted superiorly.  Infundibulum was identified and retracted inferolater
ally.  Cystic duct and cystic artery were clearly identified with blunt dissection.  Clips were place
d.  Both structures divided.  Cautery was used to remove the gallbladder from the liver bed.  Bleedin
g on the liver bed was controlled with cautery.  The gallbladder was retrieved through the umbilicus 
via an EndoCatch bag.  Right upper quadrant was irrigated.  Effluent was clear.  No evidence of bleed
ing or bile leakage appreciated.  Subsequently, all trocars were removed under direct vision.  Stay s
utures were tied to each other across the fascial defect.  Subcutaneous wounds were irrigated.  Bleed
ing was controlled with cautery.  A 3-0 chromic was used for subcutaneous tissue and close the skin. 
 Sterile dressing was applied.  Patient was awakened and taken to Recovery in good general condition.






AS/MODL

DD:  08/07/2019 12:47:57Voice ID:  [id number]

DT:  08/08/2019 00:01:15Report ID:  [id number]

## 2020-11-30 NOTE — RAD REPORT
EXAM DESCRIPTION:  CT - Abdomen   Pelvis W Contrast - 8/6/2019 6:10 am

 

CLINICAL HISTORY:  Abdominal pain.

 

COMPARISON:  None.

 

TECHNIQUE:  Axial CT imaging of the abdomen and pelvis performed with intravenous contrast. Reformatt
ed coronal and sagittal images reviewed.

A dose reduction technique was utilized with automated exposure control according to patient size.

 

FINDINGS:  LOWER THORAX:  Clear lung bases. Heart is normal in size.

ABDOMEN:

LIVER/GALLBLADDER:  Normal liver. Gallbladder is diffusely thickened with pericholecystic edema. Ther
e are numerous noncalcified stones within the gallbladder. No biliary dilatation.

SPLEEN/PANCREAS:  Normal spleen and pancreas.

KIDNEYS/ADRENAL GLANDS:  Normal adrenal glands and kidneys.

RETROPERITONEAL VESSELS/NODES:  Normal aorta, inferior vena cava caliber. No adenopathy. Minimal aort
ic atherosclerosis.

BOWEL:  Normal stomach and small bowel loops. Normal appendix in the right lower quadrant is partiall
y imaged. Moderate descending colon diverticulosis. No diverticulitis.

MESENTERY/PERITONEUM:  No ascites, free air, or adenopathy.

PELVIS:

BLADDER/GENITAL ORGANS:

PERITONEUM:

BONES AND SOFT TISSUES:

 

IMPRESSION:  Cholelithiasis with evidence of acute cholecystitis. No evidence of biliary dilatation..


08/06/2019 7:  9 AM CST

 

 

Due to temporary technical issues with the PACS/Fluency reporting system, reports are being signed by
 the in house radiologist as a courtesy to ensure prompt reporting. The interpreting radiologist is f
gabely responsible for the content of the report.
EXAM DESCRIPTION:  MRICholangiogram8/6/2019 8:19 am

 

CLINICAL HISTORY:  Abdominal pain

 

COMPARISON:  August 6 ultrasound

 

TECHNIQUE:  Magnetic resonance cholangiogram was performed. Mip reconstruction performed

 

FINDINGS:  Multiple gallstones. The gallbladder wall is thickened.

 

The biliary tree is upper limits normal caliber without a filling defect.

 

Pancreatic duct is normal caliber

 

IMPRESSION:  Cholelithiasis with cholecystitis
EXAM DESCRIPTION:  US - Abdomen Exam Limited - 8/6/2019 3:12 pm

 

CLINICAL HISTORY:  Abdominal pain

 

COMPARISON:  MRCP August 6, ultrasound August 6, CT study August 6

 

FINDINGS:  Limited sonography was performed to re-evaluate common bile duct. Multi stone cholelithias
is is again noted. Gallbladder wall is thickened. Common bile duct is normal diameter. On follow-up i
maging no common duct stone could be confirmed.

 

IMPRESSION:  No common duct dilatation and no common duct confirmed on this study.
EXAM DESCRIPTION:  US - Abdomen Exam Limited - 8/6/2019 7:14 am

 

CLINICAL HISTORY:  Abdominal pain.

 

COMPARISON:  Cat scan August 6, 2019

 

FINDINGS:  Multiple gallstones. Gallbladder wall is thickened measuring 6 millimeters.

 

The biliary tree is normal caliber. Small echogenic structure is present within the duct.

 

IMPRESSION:  Cholelithiasis with cholecystitis

 

Small echogenic structure within the duct may represent a stone or debris. The duct is normal caliber
no